# Patient Record
Sex: MALE | Employment: FULL TIME | URBAN - METROPOLITAN AREA
[De-identification: names, ages, dates, MRNs, and addresses within clinical notes are randomized per-mention and may not be internally consistent; named-entity substitution may affect disease eponyms.]

---

## 2020-10-17 ENCOUNTER — OFFICE VISIT (OUTPATIENT)
Dept: URGENT CARE | Facility: CLINIC | Age: 30
End: 2020-10-17
Payer: COMMERCIAL

## 2020-10-17 ENCOUNTER — HOSPITAL ENCOUNTER (OUTPATIENT)
Facility: MEDICAL CENTER | Age: 30
End: 2020-10-17
Attending: NURSE PRACTITIONER
Payer: COMMERCIAL

## 2020-10-17 VITALS
BODY MASS INDEX: 29.62 KG/M2 | WEIGHT: 200 LBS | DIASTOLIC BLOOD PRESSURE: 72 MMHG | HEIGHT: 69 IN | SYSTOLIC BLOOD PRESSURE: 128 MMHG | OXYGEN SATURATION: 98 % | HEART RATE: 84 BPM | TEMPERATURE: 97.3 F | RESPIRATION RATE: 16 BRPM

## 2020-10-17 DIAGNOSIS — R68.89 FLU-LIKE SYMPTOMS: ICD-10-CM

## 2020-10-17 DIAGNOSIS — Z20.822 SUSPECTED COVID-19 VIRUS INFECTION: ICD-10-CM

## 2020-10-17 LAB
FLUAV+FLUBV AG SPEC QL IA: NEGATIVE
INT CON NEG: NEGATIVE
INT CON POS: POSITIVE

## 2020-10-17 PROCEDURE — 87804 INFLUENZA ASSAY W/OPTIC: CPT | Mod: CS | Performed by: NURSE PRACTITIONER

## 2020-10-17 PROCEDURE — 99203 OFFICE O/P NEW LOW 30 MIN: CPT | Mod: CS | Performed by: NURSE PRACTITIONER

## 2020-10-17 PROCEDURE — U0003 INFECTIOUS AGENT DETECTION BY NUCLEIC ACID (DNA OR RNA); SEVERE ACUTE RESPIRATORY SYNDROME CORONAVIRUS 2 (SARS-COV-2) (CORONAVIRUS DISEASE [COVID-19]), AMPLIFIED PROBE TECHNIQUE, MAKING USE OF HIGH THROUGHPUT TECHNOLOGIES AS DESCRIBED BY CMS-2020-01-R: HCPCS

## 2020-10-17 ASSESSMENT — ENCOUNTER SYMPTOMS
HEADACHES: 1
CHILLS: 0
WHEEZING: 0
COUGH: 1
RHINORRHEA: 1
SORE THROAT: 0
FEVER: 0
MYALGIAS: 1
SHORTNESS OF BREATH: 0
SINUS PAIN: 0

## 2020-10-17 NOTE — LETTER
October 17, 2020    To Whom It May Concern:         This is confirmation that Lucho Oates attended his scheduled appointment with GEORGIA Guerrero on 10/17/20.  Your employee was seen in our clinic today.  A concern for COVID-19 has been identified and testing is in progress.  We are asking you to excuse absences as well following self-isolation protocol per Center for Disease Control (CDC).  You employee will be able to access test results through our electronic delivery system called [employer].     If the results of testing are negative, and once there has been no fever (> than 100.4 F)  without treatment, and no vomiting or diarrhea for at least 24 hours, then return to work is approved.    If the results of testing are positive than your employer will be contacted by the [facility] or [facility] department for further instructions on duration of self-isolation and return to work protocol.  In general, this will also follow the CDC guidelines with a minimum of 10 days from the onset of symptoms and without fever, vomiting, or diarrhea as above.     In general, repeat testing is NOT necessary and not offered through the [facility] care.     This is the only note that will be provided from the [facility] for this visit.  Your employee will require an appointment with a primary care provider if FMLA or disability forms are required.    If you have any questions please do not hesitate to call me at the phone number listed below.    Sincerely,           TRISTAN Guerrero.  [phone]

## 2020-10-17 NOTE — PROGRESS NOTES
Subjective:     Lucho Oates is a 30 y.o. male who presents for Cough (x1 week, body aches, congestion )      Cough  This is a new problem. The current episode started in the past 7 days (4 days ago he developed a dry cough, fatige, body aches and congestion). The problem has been unchanged. The problem occurs constantly. The cough is non-productive. Associated symptoms include headaches, myalgias, nasal congestion and rhinorrhea. Pertinent negatives include no chills, ear pain, fever, sore throat, shortness of breath or wheezing. Nothing aggravates the symptoms. He has tried nothing for the symptoms. The treatment provided mild relief. Positive for sick contacts         Review of Systems   Constitutional: Positive for malaise/fatigue. Negative for chills and fever.   HENT: Positive for congestion and rhinorrhea. Negative for ear pain, sinus pain and sore throat.    Respiratory: Positive for cough. Negative for shortness of breath and wheezing.    Musculoskeletal: Positive for myalgias.   Neurological: Positive for headaches.   All other systems reviewed and are negative.      PMH: No past medical history on file.  ALLERGIES: No Known Allergies  SURGHX: No past surgical history on file.  SOCHX:   Social History     Socioeconomic History   • Marital status: Single     Spouse name: Not on file   • Number of children: Not on file   • Years of education: Not on file   • Highest education level: Not on file   Occupational History   • Not on file   Social Needs   • Financial resource strain: Not on file   • Food insecurity     Worry: Not on file     Inability: Not on file   • Transportation needs     Medical: Not on file     Non-medical: Not on file   Tobacco Use   • Smoking status: Not on file   Substance and Sexual Activity   • Alcohol use: Not on file   • Drug use: Not on file   • Sexual activity: Not on file   Lifestyle   • Physical activity     Days per week: Not on file     Minutes per session: Not on file   •  "Stress: Not on file   Relationships   • Social connections     Talks on phone: Not on file     Gets together: Not on file     Attends [employer] service: Not on file     Active member of club or organization: Not on file     Attends meetings of clubs or organizations: Not on file     Relationship status: Not on file   • Intimate partner violence     Fear of current or ex partner: Not on file     Emotionally abused: Not on file     Physically abused: Not on file     Forced sexual activity: Not on file   Other Topics Concern   • Not on file   Social History Narrative   • Not on file     FH: No family history on file.      Objective:   /72 (Patient Position: Sitting)   Pulse 84   Temp 36.3 °C (97.3 °F) (Temporal)   Resp 16   Ht 1.753 m (5' 9\")   Wt 90.7 kg (200 lb)   SpO2 98%   BMI 29.53 kg/m²     Physical Exam  Vitals signs and nursing note reviewed.   Constitutional:       General: He is not in acute distress.     Appearance: He is normal weight. He is ill-appearing. He is not toxic-appearing.   HENT:      Head: Normocephalic and atraumatic.      Right Ear: Tympanic membrane, ear canal and external ear normal. There is no impacted cerumen.      Left Ear: Tympanic membrane, ear canal and external ear normal. There is no impacted cerumen.      Nose: Congestion and rhinorrhea present.      Mouth/Throat:      Mouth: Mucous membranes are moist.      Pharynx: No oropharyngeal exudate or posterior oropharyngeal erythema.   Eyes:      General:         Right eye: No discharge.         Left eye: No discharge.      Extraocular Movements: Extraocular movements intact.      Conjunctiva/sclera: Conjunctivae normal.      Pupils: Pupils are equal, round, and reactive to light.   Neck:      Musculoskeletal: No neck rigidity or muscular tenderness.   Cardiovascular:      Rate and Rhythm: Normal rate and regular rhythm.      Heart sounds: Normal heart sounds.   Pulmonary:      Effort: Pulmonary effort is normal. No " respiratory distress.      Breath sounds: Normal breath sounds. No stridor or decreased air movement. No wheezing, rhonchi or rales.   Chest:      Chest wall: No tenderness.   Abdominal:      General: Abdomen is flat. There is no distension.      Palpations: Abdomen is soft.      Tenderness: There is no abdominal tenderness. There is no guarding.   Musculoskeletal: Normal range of motion.   Lymphadenopathy:      Cervical: Cervical adenopathy present.   Skin:     General: Skin is warm and dry.      Capillary Refill: Capillary refill takes less than 2 seconds.   Neurological:      General: No focal deficit present.      Mental Status: He is alert and oriented to person, place, and time. Mental status is at baseline.   Psychiatric:         Mood and Affect: Mood normal.         Behavior: Behavior normal.         Judgment: Judgment normal.     POCT flu neg    Assessment/Plan:   Assessment    1. Suspected COVID-19 virus infection  COVID/SARS COV-2 PCR   2. Flu-like symptoms  POCT Influenza A/B   Pt was tested for flu and COVID today. I will call with results. Upon entering the room PPE was worn throughout the duration of his visit for both provider and patient. Mask of patient briefly removed for examination of oropharynx. PT was educated to remain in self isolation for at least 10 days following the onset of symptoms and to not return to work until symptoms have resolved for three days without the use of symptom altering medication.   We discussed supportive measures including humidifier, warm salt water gargles, over-the-counter Cepacol throat lozenges, rest  and increased fluids.  He was encouraged to seek treatment back in the ER or urgent care for worsening symptoms,  fever greater than 100.5, wheezes or shortness of breath.   AVS handout given and reviewed with patient. Pt educated on red flags and when to seek treatment back in ER or UC.

## 2020-10-17 NOTE — PATIENT INSTRUCTIONS
INSTRUCTIONS FOR COVID-19 OR ANY OTHER INFECTIOUS RESPIRATORY ILLNESSES    The Centers for Disease Control and Prevention (CDC) states that early indications for COVID-19 include cough, shortness of breath, difficulty breathing, or at least two of the following symptoms: chills, shaking with chills, muscle pain, headache, sore throat, and loss of taste or smell. Symptoms can range from mild to severe and may appear up to two weeks after exposure to the virus.    The practice of self-isolation and quarantine helps protect the public and your family by  preventing exposure to people who have or may have a contagious disease. Please follow the prevention steps below as based on CDC guidelines:    WHEN TO STOP ISOLATION: Persons with COVID-19 or any other infectious respiratory illness who have symptoms and were advised to care for themselves at home may discontinue home isolation under the following conditions:  · At least 24 hours have passed since recovery defined as resolution of fever without the use of fever-reducing medications; AND,  · Improvement in respiratory symptoms (e.g., cough, shortness of breath); AND,  · At least 10 days have passed since symptoms first appeared and have had no subsequent illness.    MONITOR YOUR SYMPTOMS: If your illness is worsening, seek prompt medical attention. If you have a medical emergency and need to call 911, notify the dispatch personnel that you have, or are being evaluated for confirmed or suspected COVID-19 or another infectious respiratory illness. Wear a facemask if possible.    ACTIVITY RESTRICTION: restrict activities outside your home, except for getting medical care. Do not go to work, school, or public areas. Avoid using public transportation, ride-sharing, or taxis.    SCHEDULED MEDICAL APPOINTMENTS: Notify your provider that you have, or are being evaluated for, confirmed or suspected COVID-19 or another infectious respiratory. This will help the healthcare  provider’s office safely take care of you and keep other people from getting exposed or infected.    FACEMASKS, when to wear: Anytime you are away from your home or around other people or pets. If you are unable to wear one, maintain a minimum of 6 feet distancing from others.    LIVING ENVIRONMENT: Stay in a separate room from other people and pets. If possible, use a separate bathroom, have someone else care for your pets and avoid sharing household items. Any items used should be washed thoroughly with soap and water. Clean all “high-touch” surfaces every day. Use a household cleaning spray or wipe, according to the label instructions. High touch surfaces include (but are not limited to) counters, tabletops, doorknobs, bathroom fixtures, toilets, phones, keyboards, tablets, and bedside tables.     HAND WASHING: Frequently wash hands with soap and water for at least 20 seconds,  especially after blowing your nose, coughing, or sneezing; going to the bathroom; before and after interacting with pets; and before and after eating or preparing food. If hands are visibly dirty use soap and water. If soap and water are not available, use an alcohol-based hand  with at least 60% alcohol. Avoid touching your eyes, nose, and mouth with unwashed hands. Cover your coughs and sneezes with a tissue. Throw used tissues in a lined trash can. Immediately wash your hands.    ACTIVE/FACILITATED SELF-MONITORING: Follow instructions provided by your local health department or health professionals, as appropriate. When working with your local health department check their available hours.    [facility]   Phone Number   [facility] [phone]   [facility] [place] [phone]   [place] [address]   [place] [phone]     IF YOU HAVE CONFIRMED POSITIVE COVID-19:    Those who have completely recovered from COVID-19 may have immune-boosting antibodies in their plasma--called “convalescent plasma”--that could be  used to treat critically ill COVID19 patients.    [facility] is excited to begin working with [facility] on collecting convalescent plasma from  people who have recovered from COVID-19 as part of a program to treat patients infected with the virus. This FDA-approved “emergency investigational new drug” is a special blood product containing antibodies that may give patients an extra boost to fight the virus.    To be eligible to donate convalescent plasma, you must have a prior COVID-19 diagnosis documented by a laboratory test (or a positive test result for SARS-CoV-2 antibodies) and meet additional eligibility requirements.    If you are interested in donating convalescent plasma or have any additional questions, please contact the [facility] Convalescent Plasma  at [phone] or via e-mail at [email or web address] Frequently Asked Questions  COVID-19 (coronavirus disease) is an infection that is caused by a large family of viruses. Some viruses cause illness in people and others cause illness in animals like camels, cats, and bats. In some cases, the viruses that cause illness in animals can spread to humans.  Where did the coronavirus come from?  In December 2019, [place] told the World Health Organization (WHO) of several cases of lung disease (human respiratory illness). These cases were linked to an open seafood and livestock market in the city of [facility]. The link to the seafood and livestock market suggests that the virus may have spread from animals to humans. However, since that first outbreak in December, the virus has also been shown to spread from person to person.  What is the name of the disease and the virus?  Disease name  Early on, this disease was called novel coronavirus. This is because scientists determined that the disease was caused by a new (novel) respiratory virus. The World Health Organization (WHO) has now named the disease COVID-19, or coronavirus  disease.  Virus name  The virus that causes the disease is called severe acute respiratory syndrome coronavirus 2 (SARS-CoV-2).  More information on disease and virus naming  World Health Organization (WHO): www.who.int/emergencies/diseases/novel-coronavirus-2019/technical-guidance/naming-the-coronavirus-disease-(covid-2019)-and-the-virus-that-causes-it  Who is at risk for complications from coronavirus disease?  Some people may be at higher risk for complications from coronavirus disease. This includes older adults and people who have chronic diseases, such as heart disease, diabetes, and lung disease.  If you are at higher risk for complications, take these extra precautions:  · Avoid close contact with people who are sick or have a fever or cough. Stay at least 3-6 ft (1-2 m) away from them, if possible.  · Wash your hands often with soap and water for at least 20 seconds.  · Avoid touching your face, mouth, nose, or eyes.  · Keep supplies on hand at home, such as food, medicine, and cleaning supplies.  · Stay home as much as possible.  · Avoid social gatherings and travel.  How does coronavirus disease spread?  The virus that causes coronavirus disease spreads easily from person to person (is contagious). There are also cases of community-spread disease. This means the disease has spread to:  · People who have no known contact with other infected people.  · People who have not traveled to areas where there are known cases.  It appears to spread from one person to another through droplets from coughing or sneezing.  Can I get the virus from touching surfaces or objects?  There is still a lot that we do not know about the virus that causes coronavirus disease. Scientists are basing a lot of information on what they know about similar viruses, such as:  · Viruses cannot generally survive on surfaces for long. They need a human body (host) to survive.  · It is more likely that the virus is spread by close contact  with people who are sick (direct contact), such as through:  ? Shaking hands or hugging.  ? Breathing in respiratory droplets that travel through the air. This can happen when an infected person coughs or sneezes on or near other people.  · It is less likely that the virus is spread when a person touches a surface or object that has the virus on it (indirect contact). The virus may be able to enter the body if the person touches a surface or object and then touches his or her face, eyes, nose, or mouth.  Can a person spread the virus without having symptoms of the disease?  It may be possible for the virus to spread before a person has symptoms of the disease, but this is most likely not the main way the virus is spreading. It is more likely for the virus to spread by being in close contact with people who are sick and breathing in the respiratory droplets of a sick person's cough or sneeze.  What are the symptoms of coronavirus disease?  Symptoms vary from person to person and can range from mild to severe. Symptoms may include:  · Fever.  · Cough.  · Tiredness, weakness, or fatigue.  · Fast breathing or feeling short of breath.  These symptoms can appear anywhere from 2 to 14 days after you have been exposed to the virus. If you develop symptoms, call your health care provider. People with severe symptoms may need hospital care.  If I am exposed to the virus, how long does it take before symptoms start?  Symptoms of coronavirus disease may appear anywhere from 2 to 14 days after a person has been exposed to the virus. If you develop symptoms, call your health care provider.  Should I be tested for this virus?  Your health care provider will decide whether to test you based on your symptoms, history of exposure, and your risk factors.  How does a health care provider test for this virus?  Health care providers will collect samples to send for testing. Samples may include:  · Taking a swab of fluid from the  nose.  · Taking fluid from the lungs by having you cough up mucus (sputum) into a sterile cup.  · Taking a blood sample.  · Taking a stool or urine sample.  Is there a treatment or vaccine for this virus?  Currently, there is no vaccine to prevent coronavirus disease. Also, there are no medicines like antibiotics or antivirals to treat the virus. A person who becomes sick is given supportive care, which means rest and fluids. A person may also relieve his or her symptoms by using over-the-counter medicines that treat sneezing, coughing, and runny nose. These are the same medicines that a person takes for the common cold.  If you develop symptoms, call your health care provider. People with severe symptoms may need hospital care.  What can I do to protect myself and my family from this virus?         You can protect yourself and your family by taking the same actions that you would take to prevent the spread of other viruses. Take the following actions:  · Wash your hands often with soap and water for at least 20 seconds. If soap and water are not available, use alcohol-based hand .  · Avoid touching your face, mouth, nose, or eyes.  · Cough or sneeze into a tissue, sleeve, or elbow. Do not cough or sneeze into your hand or the air.  ? If you cough or sneeze into a tissue, throw it away immediately and wash your hands.  · Disinfect objects and surfaces that you frequently touch every day.  · Avoid close contact with people who are sick or have a fever or cough. Stay at least 3-6 ft (1-2 m) away from them, if possible.  · Stay home if you are sick, except to get medical care. Call your health care provider before you get medical care.  · Make sure your vaccines are up to date. Ask your health care provider what vaccines you need.  What should I do if I need to travel?  Follow travel recommendations from your local health authority, the CDC, and WHO.  Travel information and advice  · Centers for Disease  Control and Prevention (CDC): www.cdc.gov/coronavirus/2019-ncov/travelers/index.html  · World Health Organization (WHO): www.who.int/emergencies/diseases/novel-coronavirus-2019/travel-advice  Know the risks and take action to protect your health  · You are at higher risk of getting coronavirus disease if you are traveling to areas with an outbreak or if you are exposed to travelers from areas with an outbreak.  · Wash your hands often and practice good hygiene to lower the risk of catching or spreading the virus.  What should I do if I am sick?  General instructions to stop the spread of infection  · Wash your hands often with soap and water for at least 20 seconds. If soap and water are not available, use alcohol-based hand .  · Cough or sneeze into a tissue, sleeve, or elbow. Do not cough or sneeze into your hand or the air.  · If you cough or sneeze into a tissue, throw it away immediately and wash your hands.  · Stay home unless you must get medical care. Call your health care provider or local health authority before you get medical care.  · Avoid public areas. Do not take public transportation, if possible.  · If you can, wear a mask if you must go out of the house or if you are in close contact with someone who is not sick.  Keep your home clean  · Disinfect objects and surfaces that are frequently touched every day. This may include:  ? Counters and tables.  ? Doorknobs and light switches.  ? Sinks and faucets.  ? Electronics such as phones, remote controls, keyboards, computers, and tablets.  · Wash dishes in hot, soapy water or use a . Air-dry your dishes.  · Wash laundry in hot water.  Prevent infecting other household members  · Let healthy household members care for children and pets, if possible. If you have to care for children or pets, wash your hands often and wear a mask.  · Sleep in a different bedroom or bed, if possible.  · Do not share personal items, such as razors,  toothbrushes, deodorant, slaughter, brushes, towels, and washcloths.  Where to find more information  Centers for Disease Control and Prevention (CDC)  · Information and news updates: www.cdc.gov/coronavirus/2019-ncov  World Health Organization (WHO)  · Information and news updates: www.who.int/emergencies/diseases/novel-coronavirus-2019  · Coronavirus health topic: www.who.int/health-topics/coronavirus  · Questions and answers on COVID-19: www.who.int/news-room/q-a-detail/v-g-rgebpvnoxkxrx  · Global tracker: who.[employer]  American Academy of Pediatrics (AAP)  · Information for families: www.healthychildren.org/English/health-issues/conditions/chest-lungs/Pages/2019-Novel-Coronavirus.aspx  The coronavirus situation is changing rapidly. Check your local health authority website or the CDC and WHO websites for updates and news.  When should I contact a health care provider?  · Contact your health care provider if you have symptoms of an infection, such as fever or cough, and you:  ? Have been near anyone who is known to have coronavirus disease.  ? Have come into contact with a person who is suspected to have coronavirus disease.  ? Have traveled outside of the country.  When should I get emergency medical care?  · Get help right away by calling your local emergency services (911 in the [place] if you have:  ? Trouble breathing.  ? Pain or pressure in your chest.  ? Confusion.  ? Blue-tinged lips and fingernails.  ? Difficulty waking from sleep.  ? Symptoms that get worse.  Let the emergency medical personnel know if you think you have coronavirus disease.  Summary  · A new respiratory virus is spreading from person to person and causing COVID-19 (coronavirus disease).  · The virus that causes COVID-19 appears to spread easily. It spreads from one person to another through droplets from coughing or sneezing.  · Older adults and those with chronic diseases are at higher risk of disease. If you are at higher risk for  complications, take extra precautions.  · There is currently no vaccine to prevent coronavirus disease. There are no medicines, such as antibiotics or antivirals, to treat the virus.  · You can protect yourself and your family by washing your hands often, avoiding touching your face, and covering your coughs and sneezes.  This information is not intended to replace advice given to you by your health care provider. Make sure you discuss any questions you have with your health care provider.  Document Released: 04/14/2020 Document Revised: 04/14/2020 Document Reviewed: 04/14/2020  Elsevier Patient Education © 2020 [employer]  COVID-19  COVID-19 is a respiratory infection that is caused by a virus called severe acute respiratory syndrome coronavirus 2 (SARS-CoV-2). The disease is also known as coronavirus disease or novel coronavirus. In some people, the virus may not cause any symptoms. In others, it may cause a serious infection. The infection can get worse quickly and can lead to complications, such as:  · Pneumonia, or infection of the lungs.  · Acute respiratory distress syndrome or ARDS. This is fluid build-up in the lungs.  · Acute respiratory failure. This is a condition in which there is not enough oxygen passing from the lungs to the body.  · Sepsis or septic shock. This is a serious bodily reaction to an infection.  · Blood clotting problems.  · Secondary infections due to bacteria or fungus.  The virus that causes COVID-19 is contagious. This means that it can spread from person to person through droplets from coughs and sneezes (respiratory secretions).  What are the causes?  This illness is caused by a virus. You may catch the virus by:  · Breathing in droplets from an infected person's cough or sneeze.  · Touching something, like a table or a doorknob, that was exposed to the virus (contaminated) and then touching your mouth, nose, or eyes.  What increases the risk?  Risk for infection  You are more  likely to be infected with this virus if you:  · Live in or travel to an area with a COVID-19 outbreak.  · Come in contact with a sick person who recently traveled to an area with a COVID-19 outbreak.  · Provide care for or live with a person who is infected with COVID-19.  Risk for serious illness  You are more likely to become seriously ill from the virus if you:  · Are 65 years of age or older.  · Have a long-term disease that lowers your body's ability to fight infection (immunocompromised).  · Live in a nursing home or long-term care facility.  · Have a long-term (chronic) disease such as:  ? Chronic lung disease, including chronic obstructive pulmonary disease or asthma  ? Heart disease.  ? Diabetes.  ? Chronic kidney disease.  ? Liver disease.  · Are obese.  What are the signs or symptoms?  Symptoms of this condition can range from mild to severe. Symptoms may appear any time from 2 to 14 days after being exposed to the virus. They include:  · A fever.  · A cough.  · Difficulty breathing.  · Chills.  · Muscle pains.  · A sore throat.  · Loss of taste or smell.  Some people may also have stomach problems, such as nausea, vomiting, or diarrhea.  Other people may not have any symptoms of COVID-19.  How is this diagnosed?  This condition may be diagnosed based on:  · Your signs and symptoms, especially if:  ? You live in an area with a COVID-19 outbreak.  ? You recently traveled to or from an area where the virus is common.  ? You provide care for or live with a person who was diagnosed with COVID-19.  · A physical exam.  · Lab tests, which may include:  ? A nasal swab to take a sample of fluid from your nose.  ? A throat swab to take a sample of fluid from your throat.  ? A sample of mucus from your lungs (sputum).  ? Blood tests.  · Imaging tests, which may include, X-rays, CT scan, or ultrasound.  How is this treated?  At present, there is no medicine to treat COVID-19. Medicines that treat other diseases are  being used on a trial basis to see if they are effective against COVID-19.  Your health care provider will talk with you about ways to treat your symptoms. For most people, the infection is mild and can be managed at home with rest, fluids, and over-the-counter medicines.  Treatment for a serious infection usually takes places in a hospital intensive care unit (ICU). It may include one or more of the following treatments. These treatments are given until your symptoms improve.  · Receiving fluids and medicines through an IV.  · Supplemental oxygen. Extra oxygen is given through a tube in the nose, a face mask, or a barth.  · Positioning you to lie on your stomach (prone position). This makes it easier for oxygen to get into the lungs.  · Continuous positive airway pressure (CPAP) or bi-level positive airway pressure (BPAP) machine. This treatment uses mild air pressure to keep the airways open. A tube that is connected to a motor delivers oxygen to the body.  · Ventilator. This treatment moves air into and out of the lungs by using a tube that is placed in your windpipe.  · Tracheostomy. This is a procedure to create a hole in the neck so that a breathing tube can be inserted.  · Extracorporeal membrane oxygenation (ECMO). This procedure gives the lungs a chance to recover by taking over the functions of the heart and lungs. It supplies oxygen to the body and removes carbon dioxide.  Follow these instructions at home:  Lifestyle  · If you are sick, stay home except to get medical care. Your health care provider will tell you how long to stay home. Call your health care provider before you go for medical care.  · Rest at home as told by your health care provider.  · Do not use any products that contain nicotine or tobacco, such as cigarettes, e-cigarettes, and chewing tobacco. If you need help quitting, ask your health care provider.  · Return to your normal activities as told by your health care provider. Ask your  health care provider what activities are safe for you.  General instructions  · Take over-the-counter and prescription medicines only as told by your health care provider.  · Drink enough fluid to keep your urine pale yellow.  · Keep all follow-up visits as told by your health care provider. This is important.  How is this prevented?    There is no vaccine to help prevent COVID-19 infection. However, there are steps you can take to protect yourself and others from this virus.  To protect yourself:   · Do not travel to areas where COVID-19 is a risk. The areas where COVID-19 is reported change often. To identify high-risk areas and travel restrictions, check the CDC travel website: wwwnc.cdc.gov/travel/notices  · If you live in, or must travel to, an area where COVID-19 is a risk, take precautions to avoid infection.  ? Stay away from people who are sick.  ? Wash your hands often with soap and water for 20 seconds. If soap and water are not available, use an alcohol-based hand .  ? Avoid touching your mouth, face, eyes, or nose.  ? Avoid going out in public, follow guidance from your state and local health authorities.  ? If you must go out in public, wear a cloth face covering or face mask.  ? Disinfect objects and surfaces that are frequently touched every day. This may include:  § Counters and tables.  § Doorknobs and light switches.  § Sinks and faucets.  § Electronics, such as phones, remote controls, keyboards, computers, and tablets.  To protect others:  If you have symptoms of COVID-19, take steps to prevent the virus from spreading to others.  · If you think you have a COVID-19 infection, contact your health care provider right away. Tell your health care team that you think you may have a COVID-19 infection.  · Stay home. Leave your house only to seek medical care. Do not use public transport.  · Do not travel while you are sick.  · Wash your hands often with soap and water for 20 seconds. If soap  and water are not available, use alcohol-based hand .  · Stay away from other members of your household. Let healthy household members care for children and pets, if possible. If you have to care for children or pets, wash your hands often and wear a mask. If possible, stay in your own room, separate from others. Use a different bathroom.  · Make sure that all people in your household wash their hands well and often.  · Cough or sneeze into a tissue or your sleeve or elbow. Do not cough or sneeze into your hand or into the air.  · Wear a cloth face covering or face mask.  Where to find more information  · Centers for Disease Control and Prevention: www.cdc.gov/coronavirus/2019-ncov/index.html  · World Health Organization: www.who.int/health-topics/coronavirus  Contact a health care provider if:  · You live in or have traveled to an area where COVID-19 is a risk and you have symptoms of the infection.  · You have had contact with someone who has COVID-19 and you have symptoms of the infection.  Get help right away if:  · You have trouble breathing.  · You have pain or pressure in your chest.  · You have confusion.  · You have bluish lips and fingernails.  · You have difficulty waking from sleep.  · You have symptoms that get worse.  These symptoms may represent a serious problem that is an emergency. Do not wait to see if the symptoms will go away. Get medical help right away. Call your local emergency services (911 in the [place] Do not drive yourself to the hospital. Let the emergency medical personnel know if you think you have COVID-19.  Summary  · COVID-19 is a respiratory infection that is caused by a virus. It is also known as coronavirus disease or novel coronavirus. It can cause serious infections, such as pneumonia, acute respiratory distress syndrome, acute respiratory failure, or sepsis.  · The virus that causes COVID-19 is contagious. This means that it can spread from person to person through  droplets from coughs and sneezes.  · You are more likely to develop a serious illness if you are 65 years of age or older, have a weak immunity, live in a nursing home, or have chronic disease.  · There is no medicine to treat COVID-19. Your health care provider will talk with you about ways to treat your symptoms.  · Take steps to protect yourself and others from infection. Wash your hands often and disinfect objects and surfaces that are frequently touched every day. Stay away from people who are sick and wear a mask if you are sick.  This information is not intended to replace advice given to you by your health care provider. Make sure you discuss any questions you have with your health care provider.  Document Released: 01/23/2020 Document Revised: 05/14/2020 Document Reviewed: 01/23/2020  [place] Patient Education © 2020 [employer]

## 2020-10-18 LAB
COVID ORDER STATUS COVID19: NORMAL
SARS-COV-2 RNA RESP QL NAA+PROBE: NOTDETECTED
SPECIMEN SOURCE: NORMAL

## 2021-04-09 ENCOUNTER — NON-PROVIDER VISIT (OUTPATIENT)
Dept: OCCUPATIONAL MEDICINE | Facility: CLINIC | Age: 31
End: 2021-04-09

## 2021-04-09 DIAGNOSIS — Z02.1 PRE-EMPLOYMENT DRUG SCREENING: ICD-10-CM

## 2021-04-09 LAB
AMP AMPHETAMINE: NORMAL
COC COCAINE: NORMAL
INT CON NEG: NORMAL
INT CON POS: NORMAL
MET METHAMPHETAMINES: NORMAL
OPI OPIATES: NORMAL
PCP PHENCYCLIDINE: NORMAL
POC DRUG COMMENT 753798-OCCUPATIONAL HEALTH: NEGATIVE
THC: NORMAL

## 2021-04-09 PROCEDURE — 80305 DRUG TEST PRSMV DIR OPT OBS: CPT | Performed by: PREVENTIVE MEDICINE
